# Patient Record
(demographics unavailable — no encounter records)

---

## 2024-10-14 NOTE — CONSULT LETTER
[Dear  ___] : Dear  [unfilled], [Consult Letter:] : I had the pleasure of evaluating your patient, [unfilled]. [Please see my note below.] : Please see my note below. [Consult Closing:] : Thank you very much for allowing me to participate in the care of this patient.  If you have any questions, please do not hesitate to contact me. [Sincerely,] : Sincerely, [FreeTextEntry3] : Cele Hutchinson MD Attending Physician, Pediatric Gastroenterology [facility]

## 2024-10-14 NOTE — PHYSICAL EXAM
[Well Developed] : well developed [NAD] : in no acute distress [PERRL] : pupils were equal, round, reactive to light  [icteric] : anicteric [Moist & Pink Mucous Membranes] : moist and pink mucous membranes [CTAB] : lungs clear to auscultation bilaterally [Respiratory Distress] : no respiratory distress  [Regular Rate and Rhythm] : regular rate and rhythm [Normal S1, S2] : normal S1 and S2 [Soft] : soft  [Distended] : non distended [Tender] : non tender [Normal Bowel Sounds] : normal bowel sounds [No HSM] : no hepatosplenomegaly appreciated [Normal Tone] : normal tone [Well-Perfused] : well-perfused [Edema] : no edema [Cyanosis] : no cyanosis [Rash] : no rash [Jaundice] : no jaundice [Interactive] : interactive [de-identified] : no perianal lesions

## 2024-10-14 NOTE — HISTORY OF PRESENT ILLNESS
[de-identified] : 45do full term infant with epigastric hernia presents for evaluation of gassiness  Normal pregnancy Emergency , heart deceleration  Normal nursery course   Persistent abdominal discomfort, gassiness Exclusive breast milk, mostly nursing, occasionally EBM Feeds every 2-3 hours, increased efficiency, 6-8 minutes per breast and satisfied after Gaining 45g daily Stools 4x daily, yellow mustard seedy, no visible blood  Minimal spit ups Sleeps consecutively 4-6 hours

## 2024-10-14 NOTE — ASSESSMENT
[Educated Patient & Family about Diagnosis] : educated the patient and family about the diagnosis [FreeTextEntry1] : 45do female infant with epigastric hernia presents for evaluation of gassiness. Overall Tanesha is feeding well and gaining weight beautifully. Normal stooling pattern and no discomfort during or after feeds. She does have intermittent episodes throughout the day of abdominal discomfort. Most likely infantile dyschezia given reassuring history and physical exam. Recommended 1/2 pediatric glycerin suppository 3 times daily to assist with gas expulsion. Symptoms expected to improve with time as she grows and develops. Follow up 4 weeks.

## 2024-10-18 NOTE — CONSULT LETTER
[Dear  ___] : Dear  [unfilled], [Consult Letter:] : I had the pleasure of evaluating your patient, [unfilled]. [Please see my note below.] : Please see my note below. [Consult Closing:] : Thank you very much for allowing me to participate in the care of this patient.  If you have any questions, please do not hesitate to contact me. [Sincerely,] : Sincerely, [FreeTextEntry2] : Dixie Davalos MD [address] [place] [address], [address] [place] [address] Phone: [phone] [FreeTextEntry3] : Alfonso Schofield MD Division of Pediatric, General, Thoracic and Endoscopic Surgery [facility]

## 2024-10-18 NOTE — ASSESSMENT
[FreeTextEntry1] : Tanesha is a 45 day old girl with two reducible epigastric hernias.  She remains asymptomatic at this time. I educated mom about this diagnosis and offered reassurance.  I reviewed treatment options with mom including observation versus surgical repair. I again reviewed the risks, benefits, and alternatives of epigastric hernia repair x2.  The risks discussed included but were not limited to bleeding, infection, injury to adjacent structures, hernia recurrence, etc. The possibility of developing new/unrelated hernias was discussed. Mom understands these hernias do not close spontaneously and the possibility of developing pain at the site without repair.  I reviewed that she would have two incisions given the distance between the two hernias. I reviewed postoperative expectations. Given her very young age, I reviewed with mom that I do not recommend proceeding at this time and the likelihood of Tanesha developing symptoms in the upcoming months is quite rare.  We discussed optimal timing for repair, and we agreed likely after her first birthday.  Mom would like to proceed with repair and agrees with this plan for timing.  I have recommended returning to see me closer to her first birthday to reassess the sites and to review the procedure.  Mom knows to contact me and/or return sooner with any further questions or concerns or should Tanesha develop any new or concerning symptoms.   None

## 2024-10-18 NOTE — HISTORY OF PRESENT ILLNESS
[FreeTextEntry1] : Tanesha is a 45 day old girl here today to be evaluated for an epigastric hernia.  Mom has noticed a bulge at the umbilical region since birth but recently noticed a bump in the midline above the umbilicus.  It was larger last week but she continues to see it come and go.  She had an ultrasound last week that demonstrated a fat containing epigastric hernia.  Tanesha does not seem to have any pain or discomfort at the site.  Mom denies any overlying skin changes.  She is feeding well but is going to see GI today for reflux.

## 2024-10-18 NOTE — CONSULT LETTER
[Dear  ___] : Dear  [unfilled], [Consult Letter:] : I had the pleasure of evaluating your patient, [unfilled]. [Please see my note below.] : Please see my note below. [Consult Closing:] : Thank you very much for allowing me to participate in the care of this patient.  If you have any questions, please do not hesitate to contact me. [Sincerely,] : Sincerely, [FreeTextEntry2] : Dixie Davalos MD [address] [place] [address], [address] [place] [address] Phone: [phone] [FreeTextEntry3] : Alfonso Schofield MD Division of Pediatric, General, Thoracic and Endoscopic Surgery [facility]

## 2024-10-18 NOTE — CONSULT LETTER
[Dear  ___] : Dear  [unfilled], [Consult Letter:] : I had the pleasure of evaluating your patient, [unfilled]. [Please see my note below.] : Please see my note below. [Consult Closing:] : Thank you very much for allowing me to participate in the care of this patient.  If you have any questions, please do not hesitate to contact me. [Sincerely,] : Sincerely, [FreeTextEntry2] : Dixie Davalos MD [address] [place] [address], [address] [place] [address] Phone: [phone] [FreeTextEntry3] : Alfonso Schofield MD Division of Pediatric, General, Thoracic and Endoscopic Surgery [facility]

## 2024-10-18 NOTE — ADDENDUM
[FreeTextEntry1] : Documented by Bereket He acting as a scribe for Dr. Schofield on 10/14/2024.   All medical record entries made by the Scribe were at my, Dr. Schofield, direction and personally dictated by me on 10/14/2024. I have reviewed the chart and agree that the record accurately reflects my personal performances of the history, physical exam, assessment and plan. I have also personally directed, reviewed, and agree with the instructions.

## 2024-10-18 NOTE — ASSESSMENT
[FreeTextEntry1] : Tanesha is a 45 day old girl with two reducible epigastric hernias.  She remains asymptomatic at this time. I educated mom about this diagnosis and offered reassurance.  I reviewed treatment options with mom including observation versus surgical repair. I again reviewed the risks, benefits, and alternatives of epigastric hernia repair x2.  The risks discussed included but were not limited to bleeding, infection, injury to adjacent structures, hernia recurrence, etc. The possibility of developing new/unrelated hernias was discussed. Mom understands these hernias do not close spontaneously and the possibility of developing pain at the site without repair.  I reviewed that she would have two incisions given the distance between the two hernias. I reviewed postoperative expectations. Given her very young age, I reviewed with mom that I do not recommend proceeding at this time and the likelihood of Tanesha developing symptoms in the upcoming months is quite rare.  We discussed optimal timing for repair, and we agreed likely after her first birthday.  Mom would like to proceed with repair and agrees with this plan for timing.  I have recommended returning to see me closer to her first birthday to reassess the sites and to review the procedure.  Mom knows to contact me and/or return sooner with any further questions or concerns or should Tanesha develop any new or concerning symptoms.

## 2024-10-18 NOTE — PHYSICAL EXAM
[NL] : grossly intact [TextBox_37] : supraumbilical epigastric hernia reducible, +palpable epigastric hernia ~2-3 finger breadths above umbilicus

## 2024-10-18 NOTE — REASON FOR VISIT
[Initial - Scheduled] : an initial, scheduled visit with concerns of [Umbilical hernia] : umbilical hernia  [Epigastric hernia] : epigastric hernia [Patient] : patient [Mother] : mother [FreeTextEntry4] : Dr Dixie Davalos

## 2024-11-05 NOTE — PHYSICAL EXAM
[Alert] : alert [Normocephalic] : normocephalic [Flat Open Anterior [place]] : flat open anterior fontanelle [PERRL] : PERRL [Red Reflex Bilateral] : red reflex bilateral [Normally Placed Ears] : normally placed ears [Auricles Well Formed] : auricles well formed [Clear Tympanic membranes] : clear tympanic membranes [Light reflex present] : light reflex present [Bony landmarks visible] : bony landmarks visible [Nares Patent] : nares patent [Palate Intact] : palate intact [Uvula Midline] : uvula midline [Supple, full passive range of motion] : supple, full passive range of motion [Symmetric Chest Rise] : symmetric chest rise [Clear to Auscultation Bilaterally] : clear to auscultation bilaterally [Regular Rate and Rhythm] : regular rate and rhythm [S1, S2 present] : S1, S2 present [+2 Femoral Pulses] : +2 femoral pulses [Soft] : soft [Bowel Sounds] : bowel sounds present [Normal external genitailia] : normal external genitalia [Patent Vagina] : vagina patent [Normally Placed] : normally placed [No Abnormal Lymph Nodes Palpated] : no abnormal lymph nodes palpated [Symmetric Flexed Extremities] : symmetric flexed extremities [Startle Reflex] : startle reflex present [Suck Reflex] : suck reflex present [Rooting] : rooting reflex present [Palmar Grasp] : palmar grasp reflex present [Plantar Grasp] : plantar grasp reflex present [Symmetric Mera] : symmetric [place] [Acute Distress] : no acute distress [Discharge] : no discharge [Palpable Masses] : no palpable masses [Murmurs] : no murmurs [Tender] : nontender [Distended] : not distended [Hepatomegaly] : no hepatomegaly [Splenomegaly] : no splenomegaly [Clitoromegaly] : no clitoromegaly [Honeycutt-Ortolani] : negative Honeycutt-Ortolani [Spinal Dimple] : no spinal dimple [Tuft of Hair] : no tuft of hair [Rash and/or lesion present] : no rash/lesion

## 2024-11-05 NOTE — HISTORY OF PRESENT ILLNESS
[Parents] : parents [Breast milk] : breast milk [Expressed Breast milk ___oz/feed] : [unfilled] oz of expressed breast milk per feed [Formula ___ oz/feed] : [unfilled] oz of formula per feed [Vitamins ___] : Patient takes [unfilled] vitamins daily [Normal] : Normal [___ voids per day] : [unfilled] voids per day [Frequency of stools: ___] : Frequency of stools: [unfilled]  stools [per day] : per day. [Yellow] : yellow [Seedy] : seedy [In Bassinet/Crib] : sleeps in bassinet/crib [On back] : sleeps on back [Water heater temperature set at <120 degrees F] : Water heater temperature set at <120 degrees F [Rear facing car seat in back seat] : Rear facing car seat in back seat [Carbon Monoxide Detectors] : Carbon monoxide detectors at home [Smoke Detectors] : Smoke detectors at home. [NO] : No [Co-sleeping] : no co-sleeping [Loose bedding, pillow, toys, and/or bumpers in crib] : no loose bedding, pillow, toys, and/or bumpers in crib [Pacifier use] : not using pacifier [de-identified] : exposure to iodine [de-identified] : Theresa aguilar

## 2024-12-11 NOTE — HISTORY OF PRESENT ILLNESS
[FreeTextEntry1] : np rashes [de-identified] : Referred by: Dr. SADIQ EDWARDS,EZEKIEL   Ms. TODD POPE  is a 3 month old F here for evaluation of below  #red spots on cheeks - x weeks. using aquaphor or vaseline no wipes to face  S: aquaphor M none D: All F&C, no fs/ds/wb/fb sister and mom with eczema   no personal or family h/o skin cancer

## 2024-12-11 NOTE — CONSULT LETTER
[Dear  ___] : Dear  [unfilled], [Consult Letter:] : I had the pleasure of evaluating your patient, [unfilled]. [Please see my note below.] : Please see my note below. [Consult Closing:] : Thank you very much for allowing me to participate in the care of this patient.  If you have any questions, please do not hesitate to contact me. [Sincerely,] : Sincerely, [FreeTextEntry3] : Irma Devries MD Department of Dermatology [facility]

## 2024-12-11 NOTE — ASSESSMENT
[Use of independent historian: [ enter independent historian's relationship to patient ] :____] : As the patient was unable to provide a complete and reliable history, I obtained clinical history from the patient's [unfilled] [FreeTextEntry1] : #atopic dermatitis, flaring on face with a component of irritant dermatitis discussed nature, chronicity and unpredictable course recommend applying liberal amounts of plain vaseline to cheeks, especially before and after meals avoid wipes to the face after meals can cleanse with water and pat dry with a clean towel/cloth - start hydrocortisone 2.5% ointment BID to AA of rough skin on face PRN roughness, SED  xerosis dry skin care reviewed, handout provided. Switch to recommended products in handout and moisturize liberally.

## 2024-12-31 NOTE — HISTORY OF PRESENT ILLNESS
[de-identified] : RSV [FreeTextEntry6] : Presents with c/o runny nose/congestion x 1 week ago, cough started 3-4 days ago.  PM peds yesterday - swabbed for RSV.  Low grade fever. Tmax 100.8-101.   Meds given: Tylenol PRN, saline. Humidifier.  Appetite/activity at baseline, drinking well, good UO.  No vomiting/No diarrhea.   + sister +RSV few days prior & over the weekend sister had AGE s/s - mom denies AGE s/s in infant.

## 2024-12-31 NOTE — PHYSICAL EXAM
[Consolable] : consolable [Playful] : playful [Clear Rhinorrhea] : clear rhinorrhea [NL] : warm, clear [Lethargic] : not lethargic [Stridor] : no stridor [Wheezing] : no wheezing [Crackles] : no crackles [Subcostal Retractions] : no subcostal retractions [Suprasternal Retractions] : no suprasternal retractions [de-identified] : MMM

## 2024-12-31 NOTE — HISTORY OF PRESENT ILLNESS
[de-identified] : RSV [FreeTextEntry6] : Presents with c/o runny nose/congestion x 1 week ago, cough started 3-4 days ago.  PM peds yesterday - swabbed for RSV.  Low grade fever. Tmax 100.8-101.   Meds given: Tylenol PRN, saline. Humidifier.  Appetite/activity at baseline, drinking well, good UO.  No vomiting/No diarrhea.   + sister +RSV few days prior & over the weekend sister had AGE s/s - mom denies AGE s/s in infant.

## 2024-12-31 NOTE — PHYSICAL EXAM
[Consolable] : consolable [Playful] : playful [Clear Rhinorrhea] : clear rhinorrhea [NL] : warm, clear [Lethargic] : not lethargic [Stridor] : no stridor [Wheezing] : no wheezing [Crackles] : no crackles [Subcostal Retractions] : no subcostal retractions [Suprasternal Retractions] : no suprasternal retractions [de-identified] : MMM

## 2024-12-31 NOTE — DISCUSSION/SUMMARY
[FreeTextEntry1] : 4 month old female with acute bronchiolitis secondary to RSV.  NO indication for antibiotic use at this time.  NO s/s respiratory distress on exam.  Supportive care reviewed -- Nasal saline w suctioning PRN, cool mist humidifier, steam up bathroom.   Tylenol dosing/indications/interval reviewed.   Good hydration discussed & good hand hygiene reviewed  If fever persists or condition worsens return for re-eval. RED FLAGS REVIEWED - indications for ED eval discussed, signs of distress/dehydration reviewed - Mom agrees with plan, demonstrates an understanding, is able to repeat back instructions and has no questions at this time.   Return sooner PRN.  Well care as scheduled.

## 2025-01-02 NOTE — HISTORY OF PRESENT ILLNESS
[de-identified] : RSV [FreeTextEntry6] : [place]+, [place] [facility] in our office yesterday for follow up Intermittent belly breathing and tachypnea Not sleeping well Feeding well overall Did have 1 episode of Post tussive emesis Normal UOP No diarrhea Still w/ fevers, was 100.4 this AM, doing tylenol PRN

## 2025-01-02 NOTE — PHYSICAL EXAM
[Clear Rhinorrhea] : clear rhinorrhea [Wheezing] : wheezing [Tachypnea] : tachypnea [Belly Breathing] : belly breathing [Subcostal Retractions] : no subcostal retractions [Suprasternal Retractions] : no suprasternal retractions [NL] : warm, clear

## 2025-01-02 NOTE — DISCUSSION/SUMMARY
[FreeTextEntry1] :  4 mo F w/ RSV here for follow up, parental concerns regarding breathing.  On exam, baby w/ increased RR and slight belly breathing, but no retractions.  Noted significant congestion.  Will trial NS nebs, continue other supportive care measures, reviewed in detail s/s of distress and indications to go to the ER for evaluation.  Has well care scheduled for 3 days from now, will recheck then, sooner if needed.

## 2025-01-03 NOTE — HISTORY OF PRESENT ILLNESS
[Well-balanced] : well-balanced [Normal] : Normal [No] : No cigarette smoke exposure [Water heater temperature set at <120 degrees F] : Water heater temperature set at <120 degrees F [Rear facing car seat in back seat] : Rear facing car seat in back seat [Carbon Monoxide Detectors] : Carbon monoxide detectors at home [Smoke Detectors] : Smoke detectors at home. [Mother] : mother [Formula ___ oz/feed] : [unfilled] oz of formula per feed [___ Feeding per 24 hrs] : a  total of [unfilled] feedings in 24 hours [___ voids per day] : [unfilled] voids per day [Frequency of stools: ___] : Frequency of stools: [unfilled]  stools [In Bassinet/Crib] : sleeps in bassinet/crib [On back] : sleeps on back [Sleeps 12-16 hours per 24 hours (including naps)] : sleeps 12-16 hours per 24 hours (including naps) [Tummy time] : tummy time [NO] : No [Fruits] : no fruits [Vegetables] : no vegetables [Loose bedding, pillow, toys, and/or bumpers in crib] : no loose bedding, pillow, toys, and/or bumpers in crib [Pacifier use] : not using pacifier [FreeTextEntry7] : RSV doign well- still w/ fever, currently on day 7, had temp to 101 yesterday [de-identified] : Feeding a little less currently; gentanjaliase [FreeTextEntry8] : currently loose [de-identified] : + thumb sucker

## 2025-01-03 NOTE — PHYSICAL EXAM
[Alert] : alert [Normocephalic] : normocephalic [Flat Open Anterior [place]] : flat open anterior fontanelle [Red Reflex] : red reflex bilateral [PERRL] : PERRL [Normally Placed Ears] : normally placed ears [Auricles Well Formed] : auricles well formed [Clear Tympanic membranes] : clear tympanic membranes [Light reflex present] : light reflex present [Bony landmarks visible] : bony landmarks visible [Nares Patent] : nares patent [Palate Intact] : palate intact [Uvula Midline] : uvula midline [Symmetric Chest Rise] : symmetric chest rise [Regular Rate and Rhythm] : regular rate and rhythm [S1, S2 present] : S1, S2 present [+2 Femoral Pulses] : (+) 2 femoral pulses [Soft] : soft [Bowel Sounds] : bowel sounds present [Normal External Genitalia] : normal external genitalia [Normal Vaginal Introitus] : normal vaginal introitus [Patent] : patent [Normally Placed] : normally placed [No Abnormal Lymph Nodes Palpated] : no abnormal lymph nodes palpated [Startle Reflex] : startle reflex present [Plantar Grasp] : plantar grasp reflex present [Symmetric Mera] : symmetric mera [Acute Distress] : no acute distress [Discharge] : no discharge [Palpable Masses] : no palpable masses [Murmurs] : no murmurs [Tender] : nontender [Distended] : nondistended [Hepatomegaly] : no hepatomegaly [Splenomegaly] : no splenomegaly [Clitoromegaly] : no clitoromegaly [Honeycutt-Ortolani] : negative Honeycutt-Ortolani [Allis Sign] : negative Allis sign [Spinal Dimple] : no spinal dimple [Tuft of Hair] : no tuft of hair [Rash or Lesions] : no rash/lesions [FreeTextEntry7] : + upper airway projection, no distress

## 2025-01-03 NOTE — DEVELOPMENTAL MILESTONES
[Normal Development] : Normal Development [Laughs aloud] : laughs aloud [Turns to voice] : turns to voice [Vocalizes with extending cooing] : vocalizes with extending cooing [Rolls over prone to supine] : rolls over prone to supine [Supports on elbows & wrists in prone] : supports on elbows and wrists in prone [Plays with fingers in midline] : plays with fingers in midline [Grasps objects] : grasps objects [Passed] : passed [FreeTextEntry2] : 1

## 2025-01-03 NOTE — DISCUSSION/SUMMARY
[FreeTextEntry1] : 4 mo F here for WCC.  Growing and developing appropriately to date. Currently w/ RSV and on day 7 of fever. Lungs sound relatively clear, just some upper airway projection.  Will get CXR to ensure no pneumonia.  No AOM noted on exam.  Due for prevnar, pentacel, rotavirus vaccines today. Deferred due to concurrent RSV/ fever.   Counseling: -Feeding: Recommend exclusive breastfeeding, 8-12 feedings per day. Mother should continue prenatal vitamins and avoid alcohol if breastfeeding. If formula is needed, recommend iron-fortified formulations, 4-6 oz every 3-4 hrs. Cereal may be introduced using a spoon and bowl. If formula fed, can also slowly introduce purees of fruits and vegetables, every 3-5 days introduce new item. -Safety:When in car, patient should be in rear-facing car seat in back seat. Put baby to sleep on back, in own crib with no loose or soft bedding. Lower crib mattress.  -Help baby to maintain sleep and feeding routines.  -May offer pacifier if needed.  -Continue tummy time when awake.  -Parents counseled to call if rectal temperature >100.4 degrees F. Tylenol dosing information given.    RTC in 2 mo for next WCC.

## 2025-02-27 NOTE — DISCUSSION/SUMMARY
[FreeTextEntry1] :  5 month girl with LOM with h/o left blocked tear duct, +mild erythema of skin above left eye without s/s conjunctivitis.  Complete 10 days of antibiotic as directed.  Will treat with Keflex to cover.  Supportive care reviewed -- Nasal saline PRN, humidifier, Tylenol dosing/intervals/indications reviewed PRN, saline nebs-- Good hydration discussed & good hand hygiene for caregivers reviewed  If fever persists > 48 hr or condition worsens return for re-eval. If no improvement within 48 hours return for re-evaluation.  Follow up in 2-3 wks for recheck at well visit.  Masking, social distancing and hand hygiene reviewed. RED FLAGS REVIEWED - indications for ED eval discussed, signs of distress/dehydration reviewed - Mom agrees with plan, demonstrates an understanding, is able to repeat back instructions and has no questions at this time.   Return sooner PRN.  Well care as scheduled.

## 2025-02-27 NOTE — HISTORY OF PRESENT ILLNESS
[de-identified] : congestion /red eyelid.  [FreeTextEntry6] : Mom called with c/o redness left upper eyelid for few days.  Mom tried warm compresses without change.  Brought baby in for further eval since last night low grade temp.  She has had cough/congestion, eye discharging eyelid looks red/puff. 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  Tmax 100.8 last night Tylenol  Appetite/activity at baseline, drinking well, good UO.  No vomiting/No diarrhea.

## 2025-02-27 NOTE — HISTORY OF PRESENT ILLNESS
[de-identified] : congestion /red eyelid.  [FreeTextEntry6] : Mom called with c/o redness left upper eyelid for few days.  Mom tried warm compresses without change.  Brought baby in for further eval since last night low grade temp.  She has had cough/congestion, eye discharging eyelid looks red/puff. 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  Tmax 100.8 last night Tylenol  Appetite/activity at baseline, drinking well, good UO.  No vomiting/No diarrhea.

## 2025-02-27 NOTE — PHYSICAL EXAM
[Consolable] : consolable [Playful] : playful [Erythema] : erythema [Purulent Effusion] : purulent effusion [Clear Effusion] : clear effusion [Clear Rhinorrhea] : clear rhinorrhea [Anterior Cervical] : anterior cervical [NL] : warm, clear [Bulging] : bulging [Irritable] : not irritable [Discharge] : no discharge [Conjuctival Injection] : no conjunctival injection [Congestion] : congestion [FreeTextEntry5] : superior to left upper eyelid with erythema; watery L > R

## 2025-03-14 NOTE — DEVELOPMENTAL MILESTONES
[Normal Development] : Normal Development [Pats or smiles at reflection] : pats or smiles at reflection [Begins to turn when name called] : begins to turn when name called [Babbles] : does not babble [Rolls over prone to supine] : rolls over prone to supine [Sits briefly without support] : sits briefly without support [Reaches for object and transfers] : reaches for object and transfers [Rakes small object with 4 fingers] : rakes small object with 4 fingers [[place] small object on surface] : bangs small object on surface [FreeTextEntry1] : + getting to all 4's

## 2025-03-14 NOTE — HISTORY OF PRESENT ILLNESS
[Mother] : mother [Formula ___ oz/feed] : [unfilled] oz of formula per feed [___ Feeding per 24 hrs] : a  total of [unfilled] feedings in 24 hours [Fruits] : fruits [Vegetables] : no vegetables [Cereal] : cereal [Egg] : no egg [Peanut] : no peanut [Dairy] : no dairy [Normal] : Normal [Frequency of stools: ___] : Frequency of stools: [unfilled]  stools [per day] : per day. [Dark green] : dark green [Yellow] : yellow [Loose] : loose consistency [In Bassinet/Crib] : sleeps in bassinet/crib [Sleeps 12-16 hours per 24 hours (including naps)] : sleeps 12-16 hours per 24 hours (including naps) [Loose bedding, pillow, toys, and/or bumpers in crib] : no loose bedding, pillow, toys, and/or bumpers in crib [Pacifier use] : not using pacifier [Tummy time] : tummy time [No] : No cigarette smoke exposure [Water heater temperature set at <120 degrees F] : Water heater temperature set at <120 degrees F [Rear facing car seat in back seat] : Rear facing car seat in back seat [Carbon Monoxide Detectors] : Carbon monoxide detectors at home [Smoke Detectors] : Smoke detectors at home. [de-identified] : Mc [de-identified] : + thumb sucker [NO] : No

## 2025-03-29 NOTE — CONSULT LETTER
[Consult Letter:] : I had the pleasure of evaluating your patient, [unfilled]. [Please see my note below.] : Please see my note below. [Consult Closing:] : Thank you very much for allowing me to participate in the care of this patient.  If you have any questions, please do not hesitate to contact me. [Sincerely,] : Sincerely, [Dear  ___] : Dear  [unfilled], [FreeTextEntry3] : Olga Lidia Plaza MD Pediatric Dermatology [facility]

## 2025-03-29 NOTE — HISTORY OF PRESENT ILLNESS
[FreeTextEntry1] : fu eczema [de-identified] : TODD POPE is a 6 month old F here for evaluation of below, here with mom  rough skin on cheeks and few rough areas on the trunk using hydrocortisone which helps but mom is worried about using it for too long S: Aquaphor baby, M: Vaseline/Vanicream, D: All F&C

## 2025-03-29 NOTE — PHYSICAL EXAM
[Alert] : alert [Well Nourished] : well nourished [Conjunctiva Non-injected] : conjunctiva non-injected [No Visual Lymphadenopathy] : no visual  lymphadenopathy [No Clubbing] : no clubbing [No Edema] : no edema [No Bromhidrosis] : no bromhidrosis [No Chromhidrosis] : no chromhidrosis [FreeTextEntry3] : thin pink eczematous plaques on cheek, arm thicker eczematous papule on L upper back

## 2025-03-29 NOTE — HISTORY OF PRESENT ILLNESS
[FreeTextEntry1] : fu eczema [de-identified] : TODD POPE is a 6 month old F here for evaluation of below, here with mom  rough skin on cheeks and few rough areas on the trunk using hydrocortisone which helps but mom is worried about using it for too long S: Aquaphor baby, M: Vaseline/Vanicream, D: All F&C

## 2025-03-29 NOTE — CONSULT LETTER
[Consult Letter:] : I had the pleasure of evaluating your patient, [unfilled]. [Please see my note below.] : Please see my note below. [Consult Closing:] : Thank you very much for allowing me to participate in the care of this patient.  If you have any questions, please do not hesitate to contact me. [Sincerely,] : Sincerely, [Dear  ___] : Dear  [unfilled], [FreeTextEntry3] : Olga Lidia Plaza MD Pediatric Dermatology [facility]

## 2025-03-29 NOTE — ASSESSMENT
[Use of independent historian: [ enter independent historian's relationship to patient ] :____] : As the patient was unable to provide a complete and reliable history, I obtained clinical history from the patient's [unfilled] [FreeTextEntry1] : # Atopic dermatitis, chronic, flare - ddx for thicker lesion includes mastocytoma although less likely given other similar lesions resolved with hydrocortisone - Start hydrocortisone 2.5% ointment BID to AAs PRN roughness. SED including atrophy, dyspigmentation, telangiectasias, striae. Proper use reviewed including only using to affected area and avoidance of prolonged use. - Start mometasone 0.1% ointment BID to AAs on the body PRN roughness - Encouraged liberal vaseline around mouth and avoidance of wipes  # Xerosis cutis - Dry skincare reviewed, handout provided  RTC 3 mos or PRN

## 2025-06-03 NOTE — DISCUSSION/SUMMARY
[FreeTextEntry1] :  9 mo F here for WCC.  Growing and developing appropriately to date.   Due for routine labs: CBC, lead.  Parent understating importance of labs, will take child soon for blood draw. Will phone with results when available.  Due for Hep B vaccines today. After discussing risks/ benefits, parent initially agreed and then deferred- will return for vaccination.   Counseling: - Feeding: Continue breastmilk or formula as desired. Increase table foods, 3 meals with 2-3 snacks per day. Incorporate up to 6 oz of water daily in a sippy cup.  - Oral Health: Wipe teeth daily with washcloth. Discussed weaning of bottle and pacifier.  - Safety: When in car, patient should be in rear-facing car seat in back seat. Put baby to sleep in own crib with no loose or soft bedding. Lower crib mattress. Ensure home is safe since baby is increasingly mobile. Be within arm's reach of baby at all times.  - Help baby to maintain consistent daily routines and sleep schedule. Recognize stranger anxiety. Use consistent, positive discipline. Avoid screen time. Read aloud to baby. - CBC and lead ordered for completion prior to next WCC, parent understands importance of getting labs done and will take in week prior to 2 yo visit.    RTC in 3 mo for next WCC.

## 2025-06-03 NOTE — HISTORY OF PRESENT ILLNESS
[Mother] : mother [Formula ___ oz/feed] : [unfilled] oz of formula per feed [___ Feeding per 24 hrs] : a total of [unfilled] feedings is 24 hours [Fruit] : fruit [Vegetables] : vegetables [Cereal] : cereal [Meat] : meat [Dairy] : dairy [Water] : water [Normal] : Normal [___ voids per day] : [unfilled] voids per day [Frequency of stools: ___] : Frequency of stools: [unfilled]  stools [per day] : per day. [In Crib] : sleeps in crib [On back] : sleeps on back [Sleeps 12-16 hours per 24 hours (including naps)] : sleeps 12-16 hours per 24 hours (including naps) [Brushing teeth] : brushing teeth [None] : Primary Fluoride Source: None [Water heater temperature set at <120 degrees F] : Water heater temperature set at <120 degrees F [Rear facing car seat in  back seat] : Rear facing car seat in  back seat [Smoke Detectors] : Smoke detectors [FreeTextEntry7] : still w/ intermittent eczema- followed by derm [de-identified] : + 2 naps [de-identified] : + tumbsucker [NO] : No

## 2025-06-03 NOTE — DEVELOPMENTAL MILESTONES
[Normal Development] : Normal Development [None] : none [Uses basic gestures] : uses basic gestures [Says "Derek" or "Mama"] : says "Derek" or "Mama" nonspecifically [Sits well without support] : sits well without support [Transitions between sitting and lying] : transitions between sitting and lying [Balances on hands and knees] : balances on hands and knees [Crawls] : crawls [Picks up small objects with 3 fingers] : picks up small objects with 3 fingers and thumb [[place] objects together] : bangs objects together [Yes] : Completed. [FreeTextEntry1] : Score=14

## 2025-06-03 NOTE — PHYSICAL EXAM
[Alert] : alert [Normocephalic] : normocephalic [Flat Open Anterior Steward] : flat open anterior fontanelle [Red Reflex] : red reflex bilateral [PERRL] : PERRL [Normally Placed Ears] : normally placed ears [Auricles Well Formed] : auricles well formed [Clear Tympanic membranes] : clear tympanic membranes [Light reflex present] : light reflex present [Bony landmarks visible] : bony landmarks visible [Nares Patent] : nares patent [Palate Intact] : palate intact [Uvula Midline] : uvula midline [Supple, full passive range of motion] : supple, full passive range of motion [Symmetric Chest Rise] : symmetric chest rise [Clear to Auscultation Bilaterally] : clear to auscultation bilaterally [Regular Rate and Rhythm] : regular rate and rhythm [S1, S2 present] : S1, S2 present [+2 Femoral Pulses] : (+) 2 femoral pulses [Soft] : soft [Bowel Sounds] : bowel sounds present [Normal External Genitalia] : normal external genitalia [Normal Vaginal Introitus] : normal vaginal introitus [No Abnormal Lymph Nodes Palpated] : no abnormal lymph nodes palpated [Symmetric abduction and rotation of hips] : symmetric abduction and rotation of hips [Straight] : straight [Cranial Nerves Grossly Intact] : cranial nerves grossly intact [Acute Distress] : no acute distress [Excessive Tearing] : no excessive tearing [Discharge] : no discharge [Palpable Masses] : no palpable masses [Murmurs] : no murmurs [Tender] : nontender [Distended] : nondistended [Hepatomegaly] : no hepatomegaly [Splenomegaly] : no splenomegaly [Clitoromegaly] : no clitoromegaly [Allis Sign] : negative Allis sign [Rash or Lesions] : no rash/lesions

## 2025-06-27 NOTE — CONSULT LETTER
[Dear  ___] : Dear  [unfilled], [Consult Letter:] : I had the pleasure of evaluating your patient, [unfilled]. [Please see my note below.] : Please see my note below. [Consult Closing:] : Thank you very much for allowing me to participate in the care of this patient.  If you have any questions, please do not hesitate to contact me. [Sincerely,] : Sincerely, [FreeTextEntry3] : Olga Lidia Plaza MD Pediatric Dermatology [facility]

## 2025-06-27 NOTE — HISTORY OF PRESENT ILLNESS
[FreeTextEntry1] : fu eczema [de-identified] : TODD POPE is a 9 month old F here for evaluation of below, here with mom eczema f/u using hydrocortisone and mometasone  - body is better, face is persistent and tough, will clear with HC but come back right away - mom using properly until smooth and 2 days more - baby is teething, drooling, and starting solid foods #birthmark on posterior R lower leg, mom noticed at 3 months, not raised, feels like it is growing down leg, baby doesn't seen bothered by it - no other spots noticed on body - brother has birthmark on back that goes up back - mom has [place] la[place] on abdomen x 2 S: Aquaphor baby, M: Vaseline/Vanicream, D: All F&C

## 2025-06-27 NOTE — ASSESSMENT
[Use of independent historian: [ enter independent historian's relationship to patient ] :____] : As the patient was unable to provide a complete and reliable history, I obtained clinical history from the patient's [unfilled] [FreeTextEntry1] : #pigmentary mosaicism  - could consider early segmental NF - education, counseling - photos taken today for monitoring - could consider genetic testing of biopsied skin if closer to skin overlying ovaries or genital area - if new lesions emerge within it, RTC PRN for possible segmental process  # Atopic dermatitis, chronic, flare  - today flare is more related to #irritant dermatitis due to teething - cont hydrocortisone 2.5% ointment BID to AAs PRN roughness. SED including atrophy, dyspigmentation, telangiectasias, striae. Proper use reviewed including only using to affected area and avoidance of prolonged use. - cont mometasone 0.1% ointment BID to AAs on the body PRN roughness - Encouraged liberal vaseline around mouth and avoidance of wipes - if persistent when older can consider tacro or eucrisa - sunscreen discussed  # Xerosis cutis - Dry skincare reviewed, handout provided  RTC PRN

## 2025-06-27 NOTE — PHYSICAL EXAM
[Alert] : alert [Well Nourished] : well nourished [Conjunctiva Non-injected] : conjunctiva non-injected [No Visual Lymphadenopathy] : no visual  lymphadenopathy [No Clubbing] : no clubbing [No Edema] : no edema [No Bromhidrosis] : no bromhidrosis [No Chromhidrosis] : no chromhidrosis [Full Body Skin Exam Performed] : performed [FreeTextEntry3] : thin pink eczematous plaques on cheek xerosis rest of body clear blaschkoid hyperpigmented patches on R lower leg with one patch of mild hypopigmentation on posterior right thigh

## 2025-06-27 NOTE — HISTORY OF PRESENT ILLNESS
[FreeTextEntry1] : fu eczema [de-identified] : TODD POPE is a 9 month old F here for evaluation of below, here with mom eczema f/u using hydrocortisone and mometasone  - body is better, face is persistent and tough, will clear with HC but come back right away - mom using properly until smooth and 2 days more - baby is teething, drooling, and starting solid foods #birthmark on posterior R lower leg, mom noticed at 3 months, not raised, feels like it is growing down leg, baby doesn't seen bothered by it - no other spots noticed on body - brother has birthmark on back that goes up back - mom has [place] la[place] on abdomen x 2 S: Aquaphor baby, M: Vaseline/Vanicream, D: All F&C

## 2025-06-27 NOTE — PHYSICAL EXAM
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         # Acute on chronic pancreatitis   # S/p pyloric conserving Whipple complicated by pancreas c/b pancreaticojejunal anastomosis stenosis  BISAP score is 1 (age). No other organ dysfunction noted.      Has had multiple ERCPs at [facility] since [facility], each with temporary stenting +/- dilation of thepancreaticojejunal stenosis. Last ERCP 10/1/2024 by Dr. Noguera with finding of patent pancreaticojejunal anastomosis.  A PD stent was placed and fell out within 4 weeks. She had relatively symptom-free duration of 3 months after the ERCP.    Dr. Noguera discussed her with options regarding repeating ERCP with PD stent vs TPiAT with the removal of the remaining pancreas. At this point, patient is hesitant for surgery. So Dr. Noguera will plan for repeat outpatient ERCP while initiating conversation on TPiAT.       RECOMMENDATIONS:  - Continue regular diet with Viokace  - Ok to stop IVF given adequate oral hydration   - Appreciate ongoing pain management by primary team  - Outpt ERCP with Dr. Noguera scheduled on 3/25      Thank you for involving us in this patient's care. Please do not hesitate to contact the GI service with any questions or concerns.      Pt care plan discussed with Dr. Gotti, GI staff physicians.      Madalyn Martinez MD, PhD  Gastroenterology Fellow  Division of Gastroenterology, Hepatology and Nutrition  [facility]  Pager: [phone]  _______________________________________________________________    Subjective:  Continues to have pain especially after dinner, responding to  IV opioid. She is drinking lot of liquid.     Objective:  Blood pressure 135/77, pulse 60, temperature 97.8  F (36.6  C), temperature source Oral, resp. rate 16, SpO2 95%, not currently breastfeeding.    Gen: A&Ox3, NAD  HEENT: sclera anicteric  CV: RRR  Lungs: breathing comfortably on room air  Abd:  soft, tender on palpation, nondistended, bowel sounds present  Skin: no jaundice, no stigmata of chronic liver disease  MS: appropriate muscle mass for age  Neuro: non focal       LABS:  BMP  Recent Labs   Lab 03/06/25  0756 03/05/25  0912 03/04/25  1111    138 142   POTASSIUM 4.2 3.8 4.4   CHLORIDE 104 104 106   GLEN 8.6* 8.4* 8.6*   CO2 29 28 27   BUN 14.0 8.3 16.2   CR 0.80 0.80 0.78   GLC 97 82 96     CBC  Recent Labs   Lab 03/06/25  0756 03/05/25  0912 03/04/25  1111   WBC 6.1 8.2 13.4*   RBC 3.69* 4.07 4.39   HGB 10.5* 11.2* 12.2   HCT 33.6* 35.7 39.8   MCV 91 88 91   MCH 28.5 27.5 27.8   MCHC 31.3* 31.4* 30.7*   RDW 15.8* 15.9* 15.7*    287 290     INRNo lab results found in last 7 days.  LFTs  Recent Labs   Lab 03/06/25  0756 03/05/25  0912 03/04/25  1111   ALKPHOS 147 165* 161*   AST 32 33 47*   ALT 17 18 24   BILITOTAL 0.4 0.4 0.4   PROTTOTAL 6.2* 6.8 7.2   ALBUMIN 3.3* 3.5 3.7      PANC  Recent Labs   Lab 03/04/25  1111   LIPASE 292*       IMAGING:  Reviewed CT.     [Alert] : alert [Well Nourished] : well nourished [Conjunctiva Non-injected] : conjunctiva non-injected [No Visual Lymphadenopathy] : no visual  lymphadenopathy [No Clubbing] : no clubbing [No Edema] : no edema [No Bromhidrosis] : no bromhidrosis [No Chromhidrosis] : no chromhidrosis [Full Body Skin Exam Performed] : performed [FreeTextEntry3] : thin pink eczematous plaques on cheek xerosis rest of body clear blaschkoid hyperpigmented patches on R lower leg with one patch of mild hypopigmentation on posterior right thigh

## 2025-06-27 NOTE — CONSULT LETTER
[Dear  ___] : Dear  [unfilled], [Consult Letter:] : I had the pleasure of evaluating your patient, [unfilled]. [Please see my note below.] : Please see my note below. [Consult Closing:] : Thank you very much for allowing me to participate in the care of this patient.  If you have any questions, please do not hesitate to contact me. [Sincerely,] : Sincerely, [FreeTextEntry3] : Olga Lidia Plaza MD Pediatric Dermatology [facility]